# Patient Record
Sex: FEMALE | HISPANIC OR LATINO | Employment: UNEMPLOYED | URBAN - METROPOLITAN AREA
[De-identification: names, ages, dates, MRNs, and addresses within clinical notes are randomized per-mention and may not be internally consistent; named-entity substitution may affect disease eponyms.]

---

## 2018-01-10 ENCOUNTER — APPOINTMENT (OUTPATIENT)
Dept: GENERAL RADIOLOGY | Facility: HOSPITAL | Age: 28
End: 2018-01-10

## 2018-01-10 ENCOUNTER — HOSPITAL ENCOUNTER (EMERGENCY)
Facility: HOSPITAL | Age: 28
Discharge: HOME | End: 2018-01-11
Attending: EMERGENCY MEDICINE | Admitting: EMERGENCY MEDICINE

## 2018-01-10 DIAGNOSIS — M54.32 LEFT SIDED SCIATICA: Primary | ICD-10-CM

## 2018-01-10 LAB
ALBUMIN SERPL-MCNC: 4.8 G/DL (ref 3.5–5.2)
ALBUMIN/GLOB SERPL: 1.4 G/DL
ALP SERPL-CCNC: 88 U/L (ref 39–117)
ALT SERPL W P-5'-P-CCNC: 21 U/L (ref 1–33)
ANION GAP SERPL CALCULATED.3IONS-SCNC: 12.3 MMOL/L
AST SERPL-CCNC: 14 U/L (ref 1–32)
BASOPHILS # BLD AUTO: 0.01 10*3/MM3 (ref 0–0.2)
BASOPHILS NFR BLD AUTO: 0.2 % (ref 0–1.5)
BILIRUB SERPL-MCNC: 0.5 MG/DL (ref 0.1–1.2)
BUN BLD-MCNC: 8 MG/DL (ref 6–20)
BUN/CREAT SERPL: 13.8 (ref 7–25)
CALCIUM SPEC-SCNC: 9.2 MG/DL (ref 8.6–10.5)
CHLORIDE SERPL-SCNC: 105 MMOL/L (ref 98–107)
CO2 SERPL-SCNC: 24.7 MMOL/L (ref 22–29)
CREAT BLD-MCNC: 0.58 MG/DL (ref 0.57–1)
CRP SERPL-MCNC: <0.03 MG/DL (ref 0–0.5)
DEPRECATED RDW RBC AUTO: 48.1 FL (ref 37–54)
EOSINOPHIL # BLD AUTO: 0.05 10*3/MM3 (ref 0–0.7)
EOSINOPHIL NFR BLD AUTO: 0.9 % (ref 0.3–6.2)
ERYTHROCYTE [DISTWIDTH] IN BLOOD BY AUTOMATED COUNT: 13.5 % (ref 11.7–13)
ERYTHROCYTE [SEDIMENTATION RATE] IN BLOOD: 8 MM/HR (ref 0–20)
GFR SERPL CREATININE-BSD FRML MDRD: 127 ML/MIN/1.73
GFR SERPL CREATININE-BSD FRML MDRD: >150 ML/MIN/1.73
GLOBULIN UR ELPH-MCNC: 3.4 GM/DL
GLUCOSE BLD-MCNC: 85 MG/DL (ref 65–99)
HCG SERPL QL: NEGATIVE
HCT VFR BLD AUTO: 42.1 % (ref 35.6–45.5)
HGB BLD-MCNC: 13.5 G/DL (ref 11.9–15.5)
IMM GRANULOCYTES # BLD: 0 10*3/MM3 (ref 0–0.03)
IMM GRANULOCYTES NFR BLD: 0 % (ref 0–0.5)
LYMPHOCYTES # BLD AUTO: 1.75 10*3/MM3 (ref 0.9–4.8)
LYMPHOCYTES NFR BLD AUTO: 30.8 % (ref 19.6–45.3)
MCH RBC QN AUTO: 31.4 PG (ref 26.9–32)
MCHC RBC AUTO-ENTMCNC: 32.1 G/DL (ref 32.4–36.3)
MCV RBC AUTO: 97.9 FL (ref 80.5–98.2)
MONOCYTES # BLD AUTO: 0.35 10*3/MM3 (ref 0.2–1.2)
MONOCYTES NFR BLD AUTO: 6.2 % (ref 5–12)
NEUTROPHILS # BLD AUTO: 3.53 10*3/MM3 (ref 1.9–8.1)
NEUTROPHILS NFR BLD AUTO: 61.9 % (ref 42.7–76)
PLATELET # BLD AUTO: 240 10*3/MM3 (ref 140–500)
PMV BLD AUTO: 10.8 FL (ref 6–12)
POTASSIUM BLD-SCNC: 3.6 MMOL/L (ref 3.5–5.2)
PROT SERPL-MCNC: 8.2 G/DL (ref 6–8.5)
RBC # BLD AUTO: 4.3 10*6/MM3 (ref 3.9–5.2)
SODIUM BLD-SCNC: 142 MMOL/L (ref 136–145)
WBC NRBC COR # BLD: 5.69 10*3/MM3 (ref 4.5–10.7)

## 2018-01-10 PROCEDURE — 99284 EMERGENCY DEPT VISIT MOD MDM: CPT

## 2018-01-10 PROCEDURE — 86140 C-REACTIVE PROTEIN: CPT | Performed by: EMERGENCY MEDICINE

## 2018-01-10 PROCEDURE — 84703 CHORIONIC GONADOTROPIN ASSAY: CPT | Performed by: EMERGENCY MEDICINE

## 2018-01-10 PROCEDURE — 96374 THER/PROPH/DIAG INJ IV PUSH: CPT

## 2018-01-10 PROCEDURE — 85025 COMPLETE CBC W/AUTO DIFF WBC: CPT | Performed by: EMERGENCY MEDICINE

## 2018-01-10 PROCEDURE — 96375 TX/PRO/DX INJ NEW DRUG ADDON: CPT

## 2018-01-10 PROCEDURE — 72110 X-RAY EXAM L-2 SPINE 4/>VWS: CPT

## 2018-01-10 PROCEDURE — 25010000002 ONDANSETRON PER 1 MG: Performed by: PHYSICIAN ASSISTANT

## 2018-01-10 PROCEDURE — 36415 COLL VENOUS BLD VENIPUNCTURE: CPT | Performed by: EMERGENCY MEDICINE

## 2018-01-10 PROCEDURE — 80053 COMPREHEN METABOLIC PANEL: CPT | Performed by: EMERGENCY MEDICINE

## 2018-01-10 PROCEDURE — 85652 RBC SED RATE AUTOMATED: CPT | Performed by: EMERGENCY MEDICINE

## 2018-01-10 RX ORDER — ONDANSETRON 2 MG/ML
4 INJECTION INTRAMUSCULAR; INTRAVENOUS ONCE
Status: COMPLETED | OUTPATIENT
Start: 2018-01-10 | End: 2018-01-10

## 2018-01-10 RX ORDER — IBUPROFEN 800 MG/1
800 TABLET ORAL ONCE
Status: COMPLETED | OUTPATIENT
Start: 2018-01-10 | End: 2018-01-10

## 2018-01-10 RX ORDER — SODIUM CHLORIDE 0.9 % (FLUSH) 0.9 %
10 SYRINGE (ML) INJECTION AS NEEDED
Status: DISCONTINUED | OUTPATIENT
Start: 2018-01-10 | End: 2018-01-11 | Stop reason: HOSPADM

## 2018-01-10 RX ADMIN — ONDANSETRON 4 MG: 2 INJECTION INTRAMUSCULAR; INTRAVENOUS at 22:08

## 2018-01-10 RX ADMIN — IBUPROFEN 800 MG: 800 TABLET ORAL at 17:10

## 2018-01-10 RX ADMIN — HYDROMORPHONE HYDROCHLORIDE 1 MG: 10 INJECTION INTRAMUSCULAR; INTRAVENOUS; SUBCUTANEOUS at 22:09

## 2018-01-10 NOTE — ED TRIAGE NOTES
Pt was standing at work this morning around 0830 and felt a squeezing pain down left leg, now c/o low back pain with shooting pain down left leg and numbness in left foot. Limited ROM due to severe pain with movement

## 2018-01-11 VITALS
BODY MASS INDEX: 26.5 KG/M2 | DIASTOLIC BLOOD PRESSURE: 61 MMHG | WEIGHT: 135 LBS | SYSTOLIC BLOOD PRESSURE: 99 MMHG | TEMPERATURE: 96.9 F | HEART RATE: 55 BPM | HEIGHT: 60 IN | OXYGEN SATURATION: 98 % | RESPIRATION RATE: 20 BRPM

## 2018-01-11 PROCEDURE — 96375 TX/PRO/DX INJ NEW DRUG ADDON: CPT

## 2018-01-11 PROCEDURE — 25010000002 KETOROLAC TROMETHAMINE PER 15 MG: Performed by: PHYSICIAN ASSISTANT

## 2018-01-11 RX ORDER — ORPHENADRINE CITRATE 100 MG/1
100 TABLET, EXTENDED RELEASE ORAL 2 TIMES DAILY
Qty: 21 TABLET | Refills: 0 | Status: SHIPPED | OUTPATIENT
Start: 2018-01-11

## 2018-01-11 RX ORDER — HYDROCODONE BITARTRATE AND ACETAMINOPHEN 5; 325 MG/1; MG/1
1-2 TABLET ORAL EVERY 6 HOURS PRN
Qty: 15 TABLET | Refills: 0 | Status: SHIPPED | OUTPATIENT
Start: 2018-01-11

## 2018-01-11 RX ORDER — KETOROLAC TROMETHAMINE 30 MG/ML
15 INJECTION, SOLUTION INTRAMUSCULAR; INTRAVENOUS ONCE
Status: COMPLETED | OUTPATIENT
Start: 2018-01-11 | End: 2018-01-11

## 2018-01-11 RX ORDER — DICLOFENAC SODIUM 75 MG/1
75 TABLET, DELAYED RELEASE ORAL 2 TIMES DAILY
Qty: 12 TABLET | Refills: 0 | Status: SHIPPED | OUTPATIENT
Start: 2018-01-11

## 2018-01-11 RX ADMIN — KETOROLAC TROMETHAMINE 15 MG: 30 INJECTION, SOLUTION INTRAMUSCULAR at 00:11

## 2018-01-11 NOTE — ED PROVIDER NOTES
The patient presents complaining to the ED of left lower back pain which radiates down her left lower extremity onset earlier this morning. She also complains of diaphoresis, near syncope, and fever, however denies falling, hitting her head, LOC, or any other sx.     Limited physical exam:  Patient is nontoxic appearing.   Back/extremities: Lumbar spine tenderness.   Neuro: Positive SLR on left at 15 degrees;  negative on right. Neurovascularly intact in left lower extremity.    I agree with the plan to review labs and XR Spine.     I supervised care provided by the midlevel provider.  We have discussed this patient's history, physical exam, and treatment plan.  I have reviewed the note and personally saw and examined the patient and agree with the plan of care.    Documentation assistance provided by win Clarke for Dr. Hernadez.  Information recorded by the scribe was done at my direction and has been verified and validated by me.       Magnolia Clarke  01/10/18 5434       Edgardo Hernadez MD  01/11/18 7202

## 2018-01-11 NOTE — DISCHARGE INSTRUCTIONS
Gentle activities as tolerated.  Warm moist compresses or cold compresses to the areas of pain, whichever gives you the most relief.  Recheck with your primary care doctor (or the one listed above) within 2 days.  Return to the emergency department for bowel or bladder incontinence, urinary retention, lower extremity weakness, groin or genital numbness or tingling, any concerns.      Narcotic pain medications can make you loopy, sleepy, constipated.  Take the Colace as prescribed, drink plenty of fluids.  Do not operate heavy machinery, drive, or make important decisions will taking this medication.  There is a risk of addiction.  Take it only as prescribed.

## 2018-01-11 NOTE — ED NOTES
Pt here for c/o back pain, numbness that started yesterday. Pt does not speak english,  phone used.  Pt states pain is 10/10, and radiates down left leg.  Pt has some tenderness to palpation over lumbar spine. Pt is alert and oriented X4, PERRLA, respirations are even and unlabored, chest rise and fall is equal in expansion.  Pt does not appear to be in distress at this time.  Bed in low position, call light within reach, side rails up X2.      Fidelina Vásquez RN  01/10/18 9446

## 2018-01-11 NOTE — ED PROVIDER NOTES
EMERGENCY DEPARTMENT ENCOUNTER    Room Number:  18/18  Date seen:  1/11/2018  Time seen: 9:01 PM  PCP: Provider Not In System    HPI:  Chief complaint:Back Pain/Numbness  Context:Nikki Diane is a 27 y.o. female who presents to the ED with c/o squeezing pain down left leg and into foot that began this morning while at work. Pt states pain worsens upon movement, ambulation, and laying down. Pt states she also has lower left sided back pain, radiating to the right. Pt denies previous pain such as this. Pt confirms abd pain 3 days ago, that has since resolved. Pt denies episodes of hematuria, dysuria, saddle paresthesias, leg weakness, urinary retention, and urinary or bowel incontinence. Pt states the last time she urinated was 4 hours ago. Pt denies intravenous drug usage. Pt visited Urgent Care today and was referred to ED. Pt does not speak any english, HPI was obtained with help from : [id number].    Onset: gradual  Location:left leg  Radiation: back, radiating to right.   Duration: more than 10 hours   Timing: constant  Character:pain  Aggravating Factors: movement, ambulation, and laying down.   Alleviating Factors: none  Severity: mild    MEDICAL RECORD REVIEW  Pt seen at Urgent Care today for back pain and left leg pain that began this morning, with numbness in left leg. Pt stated she could not walk, and was referred to ED.     ALLERGIES  Review of patient's allergies indicates no known allergies.    PAST MEDICAL HISTORY  Active Ambulatory Problems     Diagnosis Date Noted   • No Active Ambulatory Problems     Resolved Ambulatory Problems     Diagnosis Date Noted   • No Resolved Ambulatory Problems     No Additional Past Medical History       PAST SURGICAL HISTORY  History reviewed. No pertinent surgical history.    FAMILY HISTORY  History reviewed. No pertinent family history.    SOCIAL HISTORY  Social History     Social History   • Marital status: Single     Spouse name: N/A   • Number of children:  N/A   • Years of education: N/A     Occupational History   • Not on file.     Social History Main Topics   • Smoking status: Never Smoker   • Smokeless tobacco: Not on file   • Alcohol use No   • Drug use: Not on file   • Sexual activity: Not on file     Other Topics Concern   • Not on file     Social History Narrative   • No narrative on file       REVIEW OF SYSTEMS  Review of Systems   Constitutional: Negative for chills and fever.   HENT: Negative.    Eyes: Negative.    Respiratory: Negative for shortness of breath.    Cardiovascular: Negative for chest pain.   Gastrointestinal: Negative for abdominal pain.   Genitourinary: Negative for dysuria.   Musculoskeletal: Positive for gait problem (due to pain) and myalgias (left leg).   Skin: Negative.    Psychiatric/Behavioral: Negative.        PHYSICAL EXAM  ED Triage Vitals   Temp Heart Rate Resp BP SpO2   01/10/18 1240 01/10/18 1240 01/10/18 1240 01/10/18 1250 01/10/18 1240   96.9 °F (36.1 °C) 68 20 106/59 100 %      Temp src Heart Rate Source Patient Position BP Location FiO2 (%)   01/10/18 1240 -- -- -- --   Tympanic         Physical Exam   Constitutional: She is oriented to person, place, and time and well-developed, well-nourished, and in no distress. No distress.   HENT:   Head: Normocephalic and atraumatic.   Right Ear: External ear normal.   Left Ear: External ear normal.   Nose: Nose normal.   Eyes: Conjunctivae are normal.   Neck: Normal range of motion.   Cardiovascular: Normal rate and regular rhythm.    Pulmonary/Chest: Effort normal and breath sounds normal.   Musculoskeletal: Normal range of motion.        Thoracic back: She exhibits no tenderness.        Lumbar back: She exhibits no tenderness.   Left sided lumbosaccral tenderness that extends into buttock and L lateral hip.     Sensation is intact to light touch throughout the bilateral lower extremities. Muscle strength is 5/5 and symmetrical with plantarflexion and EHL. Achilles and patellar  reflexes are 2+ and equal bilaterally. DP and PT pulses are 2+ bilaterally.    Neurological: She is alert and oriented to person, place, and time. She has an abnormal Straight Leg Raise Test.   Right SLR normal. Left SLR pt resisted due to pain.    Skin: Skin is warm and dry.   Psychiatric: Affect normal.   Nursing note and vitals reviewed.      LAB RESULTS  Recent Results (from the past 24 hour(s))   Comprehensive Metabolic Panel    Collection Time: 01/10/18  2:41 PM   Result Value Ref Range    Glucose 85 65 - 99 mg/dL    BUN 8 6 - 20 mg/dL    Creatinine 0.58 0.57 - 1.00 mg/dL    Sodium 142 136 - 145 mmol/L    Potassium 3.6 3.5 - 5.2 mmol/L    Chloride 105 98 - 107 mmol/L    CO2 24.7 22.0 - 29.0 mmol/L    Calcium 9.2 8.6 - 10.5 mg/dL    Total Protein 8.2 6.0 - 8.5 g/dL    Albumin 4.80 3.50 - 5.20 g/dL    ALT (SGPT) 21 1 - 33 U/L    AST (SGOT) 14 1 - 32 U/L    Alkaline Phosphatase 88 39 - 117 U/L    Total Bilirubin 0.5 0.1 - 1.2 mg/dL    eGFR Non African Amer 127 >60 mL/min/1.73    eGFR  African Amer >150 >60 mL/min/1.73    Globulin 3.4 gm/dL    A/G Ratio 1.4 g/dL    BUN/Creatinine Ratio 13.8 7.0 - 25.0    Anion Gap 12.3 mmol/L   hCG, Serum, Qualitative    Collection Time: 01/10/18  2:41 PM   Result Value Ref Range    HCG Qualitative Negative Indeterminate, Negative   CBC Auto Differential    Collection Time: 01/10/18  2:41 PM   Result Value Ref Range    WBC 5.69 4.50 - 10.70 10*3/mm3    RBC 4.30 3.90 - 5.20 10*6/mm3    Hemoglobin 13.5 11.9 - 15.5 g/dL    Hematocrit 42.1 35.6 - 45.5 %    MCV 97.9 80.5 - 98.2 fL    MCH 31.4 26.9 - 32.0 pg    MCHC 32.1 (L) 32.4 - 36.3 g/dL    RDW 13.5 (H) 11.7 - 13.0 %    RDW-SD 48.1 37.0 - 54.0 fl    MPV 10.8 6.0 - 12.0 fL    Platelets 240 140 - 500 10*3/mm3    Neutrophil % 61.9 42.7 - 76.0 %    Lymphocyte % 30.8 19.6 - 45.3 %    Monocyte % 6.2 5.0 - 12.0 %    Eosinophil % 0.9 0.3 - 6.2 %    Basophil % 0.2 0.0 - 1.5 %    Immature Grans % 0.0 0.0 - 0.5 %    Neutrophils, Absolute 3.53  1.90 - 8.10 10*3/mm3    Lymphocytes, Absolute 1.75 0.90 - 4.80 10*3/mm3    Monocytes, Absolute 0.35 0.20 - 1.20 10*3/mm3    Eosinophils, Absolute 0.05 0.00 - 0.70 10*3/mm3    Basophils, Absolute 0.01 0.00 - 0.20 10*3/mm3    Immature Grans, Absolute 0.00 0.00 - 0.03 10*3/mm3   C-reactive Protein    Collection Time: 01/10/18  2:41 PM   Result Value Ref Range    C-Reactive Protein <0.03 0.00 - 0.50 mg/dL   Sedimentation Rate    Collection Time: 01/10/18 10:03 PM   Result Value Ref Range    Sed Rate 8 0 - 20 mm/hr       I ordered the above labs and reviewed the results    RADIOLOGY  XR Spine Lumbar 4+ View   Final Result   A total 5 views were obtained. There is normal alignment. All of the  disc spaces and vertebral bodies are normal in height. There is no  evidence of recent or old fracture or subluxation.        I ordered the above noted radiological studies and reviewed the images on the PACS system.     MEDICATIONS GIVEN IN ER  Medications   sodium chloride 0.9 % flush 10 mL (not administered)   ketorolac (TORADOL) injection 15 mg (not administered)   ibuprofen (ADVIL,MOTRIN) tablet 800 mg (800 mg Oral Given 1/10/18 1710)   ondansetron (ZOFRAN) injection 4 mg (4 mg Intravenous Given 1/10/18 2208)   HYDROmorphone (DILAUDID) injection 1 mg (1 mg Intravenous Given 1/10/18 2209)     Procedures      PROGRESS AND CONSULTS    Progress Notes:    ED Course     2147  Upon pt assessment, discussed negative labs and XR. Informed pt that pain sounds similar to sciatica. Discussed plan to discharge pt with 3 day doctor's note, steroids, pain medication, and muscle relaxer and plan to follow up with PCP if symptoms worsen or do not resolve. Informed pt of possibility of need of further imaging or MRI scan as an outpatient if symptoms worsen or do not improve. Pt directed to return to ED if she experiences bowel or bladder incontinence, urinary retention, or weakness in legs. Pt understands and agrees with plan for discharge, all  "questions answered.     2156  Dilaudid and Zofran ordered.     2200  Reviewed pt's history and workup with Dr. Hernadez.  After a bedside evaluation; Dr Hernadez agrees with the plan of care.    2344  Pt rechecked and resting comfortably, pain improved. Discussed plan to write prescriptions and discharge pt. Pt and  understand and agree with discharge plan, all questions answered.       Disposition vitals:  /62  Pulse 61  Temp 96.9 °F (36.1 °C) (Tympanic)   Resp 20  Ht 152.4 cm (60\")  Wt 61.2 kg (135 lb)  SpO2 95%  Breastfeeding? No  BMI 26.37 kg/m2      DIAGNOSIS  Final diagnoses:   Left sided sciatica       FOLLOW UP   [facility] REFERRAL SERVICE  [place] [id number] [phone]          RX     Medication List      New Prescriptions          diclofenac 75 MG EC tablet   Commonly known as:  VOLTAREN   Take 1 tablet by mouth 2 (Two) Times a Day.       HYDROcodone-acetaminophen 5-325 MG per tablet   Commonly known as:  NORCO   Take 1-2 tablets by mouth Every 6 (Six) Hours As Needed (pain).       orphenadrine 100 MG 12 hr tablet   Commonly known as:  NORFLEX   Take 1 tablet by mouth 2 (Two) Times a Day.           Barak Report  BARAK query unsuccessful secondary to prolonged retrieval time/inoperable BARAK system. I discussed the risks benefits of narcotic use with the patient.      Documentation assistance provided by win Foley for Leatha Gaines PA-C.  Information recorded by the scribe was done at my direction and has been verified and validated by me.                Lynn Foley  01/11/18 0006       WALTER Ledezma  01/12/18 9926    "